# Patient Record
Sex: FEMALE | Race: WHITE | NOT HISPANIC OR LATINO | URBAN - METROPOLITAN AREA
[De-identification: names, ages, dates, MRNs, and addresses within clinical notes are randomized per-mention and may not be internally consistent; named-entity substitution may affect disease eponyms.]

---

## 2024-05-03 ENCOUNTER — OFFICE (OUTPATIENT)
Dept: URBAN - METROPOLITAN AREA CLINIC 16 | Facility: CLINIC | Age: 39
End: 2024-05-03
Payer: MEDICAID

## 2024-05-03 VITALS — HEIGHT: 68 IN | WEIGHT: 119 LBS

## 2024-05-03 DIAGNOSIS — R19.4 CHANGE IN BOWEL HABIT: ICD-10-CM

## 2024-05-03 PROCEDURE — 99204 OFFICE O/P NEW MOD 45 MIN: CPT | Performed by: INTERNAL MEDICINE

## 2024-05-03 NOTE — SERVICENOTES
She will have aggressive treatment for constipation and medication regimen to follow in addition.  I will do MiraLAX 17 g in 8 ounces of liquid twice a day she will have stool softener such as Colace Dulcolax, she can take 2 in the PM and do prune juice applesauce or bran increase liquid intake and even try squatty potty to see if this helps with angulation to have a bowel movement.  In addition to all the above with MiraLAX and stool softener and fiber supplementation she will have lactose-free and probiotic as well

## 2024-05-03 NOTE — SERVICEHPINOTES
Remedios Parmar   is seen today for a follow-up visit.     She was seen for EGD and colonoscopy in February 2017 and had findings of class A esophagitis and mild reflux esophagitis only. She had a few erosions in the stomach and biopsies negative for H. pylori and a normal duodenum on that exam there was some mild erythema through the sigmoid colon possibly some prep related erythema and biopsies were taken of the sigmoid colon and also some mild erythema in the ileum and biopsies taken rule out IBD. 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 She had negative biopsies of the ileum and negative biopsy of the sigmoid colon and negative biopsies for H. pyloriShe was sent for consultation with the first available for change in bowels she does have history of fatigue weight loss hair loss change in bowels and constipation
br
br. She does have a history of hemorrhoids and does have some rectal bleeding she will do treatment for hemorrhoids and treatment for constipation she has significant constipation and only a couple bowel movements per week br visited="true"